# Patient Record
(demographics unavailable — no encounter records)

---

## 2025-01-29 NOTE — HEALTH RISK ASSESSMENT
[No] : No [No falls in past year] : Patient reported no falls in the past year [0] : 2) Feeling down, depressed, or hopeless: Not at all (0) [PHQ-2 Negative - No further assessment needed] : PHQ-2 Negative - No further assessment needed [Audit-CScore] : 0

## 2025-01-29 NOTE — HISTORY OF PRESENT ILLNESS
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  She is  and has a 8-yo son and a 4-yo son.  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  Last elevated prolactin in August 2021 after the last birth. Reports headaches improved occurring occasionally when she does not get enough sleep.  No visual complaints.  Periods now regular and monthly.  She still notices scant milky white discharge from her breast.  Never got a CT of the brain. History of low ferritin postpartum in 2021.  Periods are normal and not heavy and monthly History of hypothyroid in 2019 with normal thyroid ultrasound Had flu vaccine

## 2025-01-29 NOTE — HISTORY OF PRESENT ILLNESS
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  She is  and has a 8-yo son and a 4-yo son.  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  Last elevated prolactin in August 2021 after the last birth. Reports headaches improved occurring occasionally when she does not get enough sleep.  No visual complaints.  Periods now regular and monthly.  She still notices scant milky white discharge from her breast.  Never got a CT of the brain. History of low ferritin postpartum in 2021.  Periods are normal and not heavy and monthly History of hypothyroid in 2019 with normal thyroid ultrasound Had flu vaccine